# Patient Record
Sex: FEMALE | Race: BLACK OR AFRICAN AMERICAN | NOT HISPANIC OR LATINO | Employment: OTHER | URBAN - METROPOLITAN AREA
[De-identification: names, ages, dates, MRNs, and addresses within clinical notes are randomized per-mention and may not be internally consistent; named-entity substitution may affect disease eponyms.]

---

## 2023-02-22 ENCOUNTER — LAB REQUISITION (OUTPATIENT)
Dept: LAB | Facility: HOSPITAL | Age: 68
End: 2023-02-22
Payer: MEDICARE

## 2023-02-22 DIAGNOSIS — I63.81 OTHER CEREBRAL INFARCTION DUE TO OCCLUSION OR STENOSIS OF SMALL ARTERY: ICD-10-CM

## 2023-02-22 LAB
ALBUMIN SERPL-MCNC: 3.3 G/DL (ref 3.4–4.8)
ALBUMIN/GLOB SERPL: 0.9 RATIO (ref 1.1–2)
ALP SERPL-CCNC: 61 UNIT/L (ref 40–150)
ALT SERPL-CCNC: 15 UNIT/L (ref 0–55)
AST SERPL-CCNC: 17 UNIT/L (ref 5–34)
BASOPHILS # BLD AUTO: 0.04 X10(3)/MCL (ref 0–0.2)
BASOPHILS NFR BLD AUTO: 0.5 %
BILIRUBIN DIRECT+TOT PNL SERPL-MCNC: 0.6 MG/DL
BUN SERPL-MCNC: 17.8 MG/DL (ref 9.8–20.1)
CALCIUM SERPL-MCNC: 10.1 MG/DL (ref 8.4–10.2)
CHLORIDE SERPL-SCNC: 103 MMOL/L (ref 98–107)
CO2 SERPL-SCNC: 26 MMOL/L (ref 23–31)
CREAT SERPL-MCNC: 0.94 MG/DL (ref 0.55–1.02)
EOSINOPHIL # BLD AUTO: 0.12 X10(3)/MCL (ref 0–0.9)
EOSINOPHIL NFR BLD AUTO: 1.4 %
ERYTHROCYTE [DISTWIDTH] IN BLOOD BY AUTOMATED COUNT: 11.9 % (ref 11.5–17)
GFR SERPLBLD CREATININE-BSD FMLA CKD-EPI: >60 MLS/MIN/1.73/M2
GLOBULIN SER-MCNC: 3.7 GM/DL (ref 2.4–3.5)
GLUCOSE SERPL-MCNC: 137 MG/DL (ref 82–115)
HCT VFR BLD AUTO: 35.8 % (ref 37–47)
HGB BLD-MCNC: 11.8 G/DL (ref 12–16)
IMM GRANULOCYTES # BLD AUTO: 0.02 X10(3)/MCL (ref 0–0.04)
IMM GRANULOCYTES NFR BLD AUTO: 0.2 %
LYMPHOCYTES # BLD AUTO: 2.09 X10(3)/MCL (ref 0.6–4.6)
LYMPHOCYTES NFR BLD AUTO: 24.1 %
MCH RBC QN AUTO: 29.4 PG
MCHC RBC AUTO-ENTMCNC: 33 G/DL (ref 33–36)
MCV RBC AUTO: 89.1 FL (ref 80–94)
MONOCYTES # BLD AUTO: 0.53 X10(3)/MCL (ref 0.1–1.3)
MONOCYTES NFR BLD AUTO: 6.1 %
NEUTROPHILS # BLD AUTO: 5.88 X10(3)/MCL (ref 2.1–9.2)
NEUTROPHILS NFR BLD AUTO: 67.7 %
NRBC BLD AUTO-RTO: 0 %
PLATELET # BLD AUTO: 414 X10(3)/MCL (ref 130–400)
PMV BLD AUTO: 11.5 FL (ref 7.4–10.4)
POTASSIUM SERPL-SCNC: 4.7 MMOL/L (ref 3.5–5.1)
PREALB SERPL-MCNC: 20 MG/DL (ref 14–37)
PROT SERPL-MCNC: 7 GM/DL (ref 5.8–7.6)
RBC # BLD AUTO: 4.02 X10(6)/MCL (ref 4.2–5.4)
SODIUM SERPL-SCNC: 139 MMOL/L (ref 136–145)
WBC # SPEC AUTO: 8.7 X10(3)/MCL (ref 4.5–11.5)

## 2023-02-22 PROCEDURE — 85025 COMPLETE CBC W/AUTO DIFF WBC: CPT | Performed by: INTERNAL MEDICINE

## 2023-02-22 PROCEDURE — 80053 COMPREHEN METABOLIC PANEL: CPT | Performed by: INTERNAL MEDICINE

## 2023-02-22 PROCEDURE — 84134 ASSAY OF PREALBUMIN: CPT | Performed by: INTERNAL MEDICINE

## 2023-03-02 ENCOUNTER — HOSPITAL ENCOUNTER (EMERGENCY)
Facility: HOSPITAL | Age: 68
Discharge: LEFT BEFORE DISCHARGE | End: 2023-03-03
Payer: MEDICARE

## 2023-03-02 VITALS
WEIGHT: 145 LBS | TEMPERATURE: 98 F | OXYGEN SATURATION: 98 % | DIASTOLIC BLOOD PRESSURE: 74 MMHG | SYSTOLIC BLOOD PRESSURE: 116 MMHG | HEART RATE: 106 BPM | RESPIRATION RATE: 20 BRPM

## 2023-03-02 DIAGNOSIS — R13.10 DYSPHAGIA: ICD-10-CM

## 2023-03-02 LAB
ALBUMIN SERPL-MCNC: 3.6 G/DL (ref 3.4–4.8)
ALBUMIN/GLOB SERPL: 0.8 RATIO (ref 1.1–2)
ALP SERPL-CCNC: 66 UNIT/L (ref 40–150)
ALT SERPL-CCNC: 22 UNIT/L (ref 0–55)
APTT PPP: 28.6 SECONDS (ref 23.2–33.7)
AST SERPL-CCNC: 20 UNIT/L (ref 5–34)
BASOPHILS # BLD AUTO: 0.05 X10(3)/MCL (ref 0–0.2)
BASOPHILS NFR BLD AUTO: 0.5 %
BILIRUBIN DIRECT+TOT PNL SERPL-MCNC: 0.6 MG/DL
BUN SERPL-MCNC: 21.5 MG/DL (ref 9.8–20.1)
CALCIUM SERPL-MCNC: 10.3 MG/DL (ref 8.4–10.2)
CHLORIDE SERPL-SCNC: 98 MMOL/L (ref 98–107)
CO2 SERPL-SCNC: 26 MMOL/L (ref 23–31)
CREAT SERPL-MCNC: 1.1 MG/DL (ref 0.55–1.02)
EOSINOPHIL # BLD AUTO: 0.11 X10(3)/MCL (ref 0–0.9)
EOSINOPHIL NFR BLD AUTO: 1.1 %
ERYTHROCYTE [DISTWIDTH] IN BLOOD BY AUTOMATED COUNT: 12.2 % (ref 11.5–17)
GFR SERPLBLD CREATININE-BSD FMLA CKD-EPI: 56 MLS/MIN/1.73/M2
GLOBULIN SER-MCNC: 4.5 GM/DL (ref 2.4–3.5)
GLUCOSE SERPL-MCNC: 160 MG/DL (ref 82–115)
HCT VFR BLD AUTO: 37.3 % (ref 37–47)
HGB BLD-MCNC: 12.2 G/DL (ref 12–16)
IMM GRANULOCYTES # BLD AUTO: 0.03 X10(3)/MCL (ref 0–0.04)
IMM GRANULOCYTES NFR BLD AUTO: 0.3 %
INR BLD: 1.11 (ref 0–1.3)
LYMPHOCYTES # BLD AUTO: 2.05 X10(3)/MCL (ref 0.6–4.6)
LYMPHOCYTES NFR BLD AUTO: 20 %
MCH RBC QN AUTO: 29 PG
MCHC RBC AUTO-ENTMCNC: 32.7 G/DL (ref 33–36)
MCV RBC AUTO: 88.6 FL (ref 80–94)
MONOCYTES # BLD AUTO: 0.68 X10(3)/MCL (ref 0.1–1.3)
MONOCYTES NFR BLD AUTO: 6.6 %
NEUTROPHILS # BLD AUTO: 7.32 X10(3)/MCL (ref 2.1–9.2)
NEUTROPHILS NFR BLD AUTO: 71.5 %
NRBC BLD AUTO-RTO: 0 %
PLATELET # BLD AUTO: 426 X10(3)/MCL (ref 130–400)
PMV BLD AUTO: 11.3 FL (ref 7.4–10.4)
POTASSIUM SERPL-SCNC: 4.4 MMOL/L (ref 3.5–5.1)
PROT SERPL-MCNC: 8.1 GM/DL (ref 5.8–7.6)
PROTHROMBIN TIME: 14.2 SECONDS (ref 12.5–14.5)
RBC # BLD AUTO: 4.21 X10(6)/MCL (ref 4.2–5.4)
SODIUM SERPL-SCNC: 137 MMOL/L (ref 136–145)
WBC # SPEC AUTO: 10.2 X10(3)/MCL (ref 4.5–11.5)

## 2023-03-02 PROCEDURE — 80053 COMPREHEN METABOLIC PANEL: CPT | Performed by: NURSE PRACTITIONER

## 2023-03-02 PROCEDURE — 85025 COMPLETE CBC W/AUTO DIFF WBC: CPT | Performed by: NURSE PRACTITIONER

## 2023-03-02 PROCEDURE — 99284 EMERGENCY DEPT VISIT MOD MDM: CPT | Mod: 25

## 2023-03-02 PROCEDURE — 85730 THROMBOPLASTIN TIME PARTIAL: CPT | Performed by: NURSE PRACTITIONER

## 2023-03-02 PROCEDURE — 85610 PROTHROMBIN TIME: CPT | Performed by: NURSE PRACTITIONER

## 2023-03-02 NOTE — FIRST PROVIDER EVALUATION
Medical screening examination initiated.  I have conducted a focused provider triage encounter, findings are as follows:    Brief history of present illness:  68 y/o female who presents from [facility] for failing swallow study and now needs PEG tube placement.     There were no vitals filed for this visit.    Pertinent physical exam:  alert, in wheelchair, states her head hurts    Brief workup plan:  exam, labs    Preliminary workup initiated; this workup will be continued and followed by the physician or advanced practice provider that is assigned to the patient when roomed.

## 2023-03-16 ENCOUNTER — TELEPHONE (OUTPATIENT)
Dept: GASTROENTEROLOGY | Facility: CLINIC | Age: 68
End: 2023-03-16
Payer: MEDICARE

## 2023-03-16 NOTE — TELEPHONE ENCOUNTER
----- Message from Rebekah Bass sent at 3/15/2023  1:34 PM CDT -----  Contact: @ [phone] or [phone]   is calling to make a appointment for a hospital discharge for  GERD (Acid Reflux); Dysphagia (Difficulty Swallowing) no available dates please call and adv @ [phone] or [phone]

## 2023-03-22 ENCOUNTER — TELEPHONE (OUTPATIENT)
Dept: GASTROENTEROLOGY | Facility: CLINIC | Age: 68
End: 2023-03-22
Payer: MEDICARE

## 2023-03-22 NOTE — TELEPHONE ENCOUNTER
----- Message from Ciarra Potter sent at 3/22/2023  1:54 PM CDT -----  Regarding: Appt Access  Contact: [phone]   is calling to make a appointment for a hospital discharge for  GERD (Acid Reflux); Dysphagia (Difficulty Swallowing) no available dates please call and adv @ [phone] or [phone]

## 2023-03-24 ENCOUNTER — TELEPHONE (OUTPATIENT)
Dept: ENDOSCOPY | Facility: HOSPITAL | Age: 68
End: 2023-03-24
Payer: MEDICARE

## 2023-03-24 NOTE — TELEPHONE ENCOUNTER
----- Message from Domenica Bolden RN sent at 3/24/2023 11:17 AM CDT -----  Regarding: FW: Appt  Contact: [phone]  Not sure who this needs to go to  ----- Message -----  From: Raul Sullivan  Sent: 3/24/2023  11:14 AM CDT  To: Mati Hernandez Staff  Subject: Appt                                             [facility] Care Coordinator (Fouzia Whitaker) calling on behalf of patient requesting a appt for possible Cancer that the patient may have. I was unable to schedule .... please call and advise @ [phone] or email [email or web address]

## 2023-03-27 ENCOUNTER — TELEPHONE (OUTPATIENT)
Dept: ENDOSCOPY | Facility: HOSPITAL | Age: 68
End: 2023-03-27
Payer: MEDICARE

## 2023-03-27 NOTE — TELEPHONE ENCOUNTER
----- Message from Moira Feldman sent at 3/27/2023  8:59 AM CDT -----  Regarding: appt  Contact: Fouzia [phone] or Cori [phone]  Call[facility]Fouzia from [facility] returning a call regarding an appt for Pt. Please call to discuss further.

## 2023-03-27 NOTE — TELEPHONE ENCOUNTER
----- Message from Domenica Bolden RN sent at 3/27/2023  1:13 PM CDT -----  Regarding: FW: pt called    ----- Message -----  From: Jerica Torres  Sent: 3/27/2023   1:10 PM CDT  To: Mati Hernandez Staff  Subject: pt called                                        Type:  Patient Returning Call        Who Called:         Who Left Message for Patient:    Anisa called    Does the patient know what this is regarding?    The pt needs to be seen as soon as possible if you leave a message please give them a appt and they will contact you if it doesn't work.     Best Call Back Number: [phone] (home)           Additional Information:

## 2023-03-29 ENCOUNTER — TELEPHONE (OUTPATIENT)
Dept: ENDOSCOPY | Facility: HOSPITAL | Age: 68
End: 2023-03-29
Payer: MEDICARE

## 2023-03-29 NOTE — TELEPHONE ENCOUNTER
----- Message from Ngozi Shahid sent at 3/29/2023 12:43 PM CDT -----  Regarding: call back  Contact: tosha  [phone]  Case management call in requesting call back please call to discus  further

## 2023-03-30 ENCOUNTER — TELEPHONE (OUTPATIENT)
Dept: ENDOSCOPY | Facility: HOSPITAL | Age: 68
End: 2023-03-30
Payer: MEDICARE

## 2023-03-30 NOTE — TELEPHONE ENCOUNTER
----- Message from Ciarra Potter sent at 3/30/2023  8:56 AM CDT -----  Regarding: Pt Advice  Contact: [phone]  Fouzia with [employer] is calling in reference to the above patient.  Please call.

## 2023-03-30 NOTE — TELEPHONE ENCOUNTER
----- Message from Vitor Salas MA sent at 3/30/2023  1:40 PM CDT -----  Regarding: FW: returning missed call  Contact: Cori [phone]    ----- Message -----  From: Miquel Morillo  Sent: 3/30/2023  12:39 PM CDT  To: [facility] Clinical Staff  Subject: returning missed call                            Cori Ballesteros from [facility], returning call from Filippo. Please call to advise further thank you.

## 2023-04-03 ENCOUNTER — LAB REQUISITION (OUTPATIENT)
Dept: LAB | Facility: HOSPITAL | Age: 68
End: 2023-04-03
Payer: MEDICARE

## 2023-04-03 DIAGNOSIS — K92.1 MELENA: ICD-10-CM

## 2023-04-03 LAB
ALBUMIN SERPL-MCNC: 3.4 G/DL (ref 3.4–4.8)
ALBUMIN/GLOB SERPL: 1 RATIO (ref 1.1–2)
ALP SERPL-CCNC: 51 UNIT/L (ref 40–150)
ALT SERPL-CCNC: 18 UNIT/L (ref 0–55)
AST SERPL-CCNC: 17 UNIT/L (ref 5–34)
BASOPHILS # BLD AUTO: 0.04 X10(3)/MCL (ref 0–0.2)
BASOPHILS NFR BLD AUTO: 0.5 %
BILIRUBIN DIRECT+TOT PNL SERPL-MCNC: 0.5 MG/DL
BUN SERPL-MCNC: 9.9 MG/DL (ref 9.8–20.1)
CALCIUM SERPL-MCNC: 10.1 MG/DL (ref 8.4–10.2)
CHLORIDE SERPL-SCNC: 105 MMOL/L (ref 98–107)
CO2 SERPL-SCNC: 26 MMOL/L (ref 23–31)
CREAT SERPL-MCNC: 1.03 MG/DL (ref 0.55–1.02)
EOSINOPHIL # BLD AUTO: 0.14 X10(3)/MCL (ref 0–0.9)
EOSINOPHIL NFR BLD AUTO: 1.6 %
ERYTHROCYTE [DISTWIDTH] IN BLOOD BY AUTOMATED COUNT: 13.2 % (ref 11.5–17)
GFR SERPLBLD CREATININE-BSD FMLA CKD-EPI: 60 MLS/MIN/1.73/M2
GLOBULIN SER-MCNC: 3.3 GM/DL (ref 2.4–3.5)
GLUCOSE SERPL-MCNC: 204 MG/DL (ref 82–115)
HCT VFR BLD AUTO: 37.1 % (ref 37–47)
HGB BLD-MCNC: 11.7 G/DL (ref 12–16)
IMM GRANULOCYTES # BLD AUTO: 0.02 X10(3)/MCL (ref 0–0.04)
IMM GRANULOCYTES NFR BLD AUTO: 0.2 %
LYMPHOCYTES # BLD AUTO: 2.23 X10(3)/MCL (ref 0.6–4.6)
LYMPHOCYTES NFR BLD AUTO: 25.7 %
MCH RBC QN AUTO: 28.8 PG (ref 27–31)
MCHC RBC AUTO-ENTMCNC: 31.5 G/DL (ref 33–36)
MCV RBC AUTO: 91.4 FL (ref 80–94)
MONOCYTES # BLD AUTO: 0.52 X10(3)/MCL (ref 0.1–1.3)
MONOCYTES NFR BLD AUTO: 6 %
NEUTROPHILS # BLD AUTO: 5.73 X10(3)/MCL (ref 2.1–9.2)
NEUTROPHILS NFR BLD AUTO: 66 %
NRBC BLD AUTO-RTO: 0 %
PLATELET # BLD AUTO: 220 X10(3)/MCL (ref 130–400)
PMV BLD AUTO: 12.3 FL (ref 7.4–10.4)
POTASSIUM SERPL-SCNC: 4.1 MMOL/L (ref 3.5–5.1)
PROT SERPL-MCNC: 6.7 GM/DL (ref 5.8–7.6)
RBC # BLD AUTO: 4.06 X10(6)/MCL (ref 4.2–5.4)
SODIUM SERPL-SCNC: 140 MMOL/L (ref 136–145)
WBC # SPEC AUTO: 8.7 X10(3)/MCL (ref 4.5–11.5)

## 2023-04-03 PROCEDURE — 85025 COMPLETE CBC W/AUTO DIFF WBC: CPT | Performed by: NURSE PRACTITIONER

## 2023-04-03 PROCEDURE — 80053 COMPREHEN METABOLIC PANEL: CPT | Performed by: NURSE PRACTITIONER

## 2023-08-01 ENCOUNTER — LAB REQUISITION (OUTPATIENT)
Dept: LAB | Facility: HOSPITAL | Age: 68
End: 2023-08-01
Payer: MEDICARE

## 2023-08-01 DIAGNOSIS — I10 ESSENTIAL (PRIMARY) HYPERTENSION: ICD-10-CM

## 2023-08-01 DIAGNOSIS — E78.5 HYPERLIPIDEMIA, UNSPECIFIED: ICD-10-CM

## 2023-08-01 LAB
ALBUMIN SERPL-MCNC: 3.4 G/DL (ref 3.4–4.8)
ALBUMIN/GLOB SERPL: 1.2 RATIO (ref 1.1–2)
ALP SERPL-CCNC: 56 UNIT/L (ref 40–150)
ALT SERPL-CCNC: 17 UNIT/L (ref 0–55)
AST SERPL-CCNC: 15 UNIT/L (ref 5–34)
BASOPHILS # BLD AUTO: 0.04 X10(3)/MCL
BASOPHILS NFR BLD AUTO: 0.4 %
BILIRUBIN DIRECT+TOT PNL SERPL-MCNC: 0.2 MG/DL
BUN SERPL-MCNC: 12.5 MG/DL (ref 9.8–20.1)
CALCIUM SERPL-MCNC: 9.8 MG/DL (ref 8.4–10.2)
CHLORIDE SERPL-SCNC: 103 MMOL/L (ref 98–107)
CHOLEST SERPL-MCNC: 138 MG/DL
CHOLEST/HDLC SERPL: 3 {RATIO} (ref 0–5)
CO2 SERPL-SCNC: 31 MMOL/L (ref 23–31)
CREAT SERPL-MCNC: 0.99 MG/DL (ref 0.55–1.02)
EOSINOPHIL # BLD AUTO: 0.19 X10(3)/MCL (ref 0–0.9)
EOSINOPHIL NFR BLD AUTO: 2.1 %
ERYTHROCYTE [DISTWIDTH] IN BLOOD BY AUTOMATED COUNT: 13.6 % (ref 11.5–17)
GFR SERPLBLD CREATININE-BSD FMLA CKD-EPI: >60 MLS/MIN/1.73/M2
GLOBULIN SER-MCNC: 2.8 GM/DL (ref 2.4–3.5)
GLUCOSE SERPL-MCNC: 135 MG/DL (ref 82–115)
HCT VFR BLD AUTO: 35.6 % (ref 37–47)
HDLC SERPL-MCNC: 42 MG/DL (ref 35–60)
HGB BLD-MCNC: 11.5 G/DL (ref 12–16)
IMM GRANULOCYTES # BLD AUTO: 0.01 X10(3)/MCL (ref 0–0.04)
IMM GRANULOCYTES NFR BLD AUTO: 0.1 %
LDLC SERPL CALC-MCNC: 76 MG/DL (ref 50–140)
LYMPHOCYTES # BLD AUTO: 2.67 X10(3)/MCL (ref 0.6–4.6)
LYMPHOCYTES NFR BLD AUTO: 29.4 %
MCH RBC QN AUTO: 29.1 PG (ref 27–31)
MCHC RBC AUTO-ENTMCNC: 32.3 G/DL (ref 33–36)
MCV RBC AUTO: 90.1 FL (ref 80–94)
MONOCYTES # BLD AUTO: 0.6 X10(3)/MCL (ref 0.1–1.3)
MONOCYTES NFR BLD AUTO: 6.6 %
NEUTROPHILS # BLD AUTO: 5.57 X10(3)/MCL (ref 2.1–9.2)
NEUTROPHILS NFR BLD AUTO: 61.4 %
NRBC BLD AUTO-RTO: 0 %
PLATELET # BLD AUTO: 161 X10(3)/MCL (ref 130–400)
PMV BLD AUTO: 11.8 FL (ref 7.4–10.4)
POTASSIUM SERPL-SCNC: 4.8 MMOL/L (ref 3.5–5.1)
PREALB SERPL-MCNC: 22 MG/DL (ref 14–37)
PROT SERPL-MCNC: 6.2 GM/DL (ref 5.8–7.6)
RBC # BLD AUTO: 3.95 X10(6)/MCL (ref 4.2–5.4)
SODIUM SERPL-SCNC: 142 MMOL/L (ref 136–145)
TRIGL SERPL-MCNC: 100 MG/DL (ref 37–140)
VLDLC SERPL CALC-MCNC: 20 MG/DL
WBC # SPEC AUTO: 9.08 X10(3)/MCL (ref 4.5–11.5)

## 2023-08-01 PROCEDURE — 80053 COMPREHEN METABOLIC PANEL: CPT | Performed by: INTERNAL MEDICINE

## 2023-08-01 PROCEDURE — 80061 LIPID PANEL: CPT | Performed by: INTERNAL MEDICINE

## 2023-08-01 PROCEDURE — 85025 COMPLETE CBC W/AUTO DIFF WBC: CPT | Performed by: INTERNAL MEDICINE

## 2023-08-01 PROCEDURE — 84134 ASSAY OF PREALBUMIN: CPT | Performed by: INTERNAL MEDICINE

## 2023-11-03 ENCOUNTER — HOSPITAL ENCOUNTER (OUTPATIENT)
Dept: TELEMEDICINE | Facility: HOSPITAL | Age: 68
Discharge: HOME | End: 2023-11-03
Payer: MEDICARE

## 2023-11-03 DIAGNOSIS — I63.232 STROKE DUE TO STENOSIS OF LEFT CAROTID ARTERY: ICD-10-CM

## 2023-11-03 PROCEDURE — G0426 INPT/ED TELECONSULT50: HCPCS | Mod: 95,,, | Performed by: STUDENT IN AN ORGANIZED HEALTH CARE EDUCATION/TRAINING PROGRAM

## 2023-11-03 PROCEDURE — G0426 PR INPT TELEHEALTH CONSULT 50M: ICD-10-PCS | Mod: 95,,, | Performed by: STUDENT IN AN ORGANIZED HEALTH CARE EDUCATION/TRAINING PROGRAM

## 2023-11-03 PROCEDURE — G0426 PR INPT TELEHEALTH CONSULT 50M: ICD-10-PCS | Mod: 95,G0,, | Performed by: STUDENT IN AN ORGANIZED HEALTH CARE EDUCATION/TRAINING PROGRAM

## 2023-11-03 PROCEDURE — G0426 INPT/ED TELECONSULT50: HCPCS | Mod: 95,G0,, | Performed by: STUDENT IN AN ORGANIZED HEALTH CARE EDUCATION/TRAINING PROGRAM

## 2023-11-03 NOTE — CONSULTS
[facility]  Vascular Neurology  Comprehensive Stroke Center  TeleVascular Neurology Acute Consultation Note      Consults    Consulting Provider: RENÉ العراقي  Current Providers  No providers found    Patient Location: [facility] TELEMEDICINE ED [facility] Emergency Department  Spoke hospital nurse at bedside with patient assisting consultant.     Patient information was obtained from ER records.         Assessment/Plan:       Diagnoses:   Neuro  * Stroke due to stenosis of left carotid artery  67 yo F w/ PMHx of IS, HLD, Schizophremia, HTN who presents w/ RSW.   Baseline aphasia.   Comes from NH -last checked at 12 AM, at 8 AM they noted RSW at that time.   LKW  12 AM  No family member available at bedside to assist w/ history.   /71  HR 69      NIHSS 18. Unclear what symptoms are chronic vs acute as no family at bedside but reportedly aphasia is baseline and RSW is new.   CTH w/L insular hypodensity and multiple areas of encephalomalacia in the L MCA distribution, within borderzone territories.,   Not a TNK candidate as OOW and w/ new area of hypodensity consistent w/ acute infarct.     Pending CTA H/N to evaluate vasculature and determine if she is a thrombectomy candidate.     Recommendations:  CTA Head & Neck ASAP to evaluate cervico-cephalic vasculature for high risk culprit vessel disease or large/medium vessel occlusion  If no LVO, recommend admission for completion of stroke w/u  MRI brain to evaluate for presence and/or extent of ischemic injury  Allow for permissive HTNsion up to 220/110 until AIS is r/o w/ imaging   Lipid profile, A1c, TSH  ECHO w/o bubble study  PT/OT/SLP eval and Rx  IVF to allow for maximum cerebral perfusion  HOB flat   Load aspirin 325 mg & clopidogrel 300 mg x 1 now, followed by daily aspirin 81 mg /  clopidogrel 75 mg x 30 days followed by monotherapy thereafter  If unable to swallow safely, recommend  rectal  mg  High intensity statin for LDL goal <70 long term     Recommendations discussed w/ Treating ER physician.           STROKE DOCUMENTATION     Acute Stroke Times:   Acute Stroke Times   Last Known Normal Date: 11/03/23  Last Known Normal Time: 1200  Symptom Onset Date: 11/03/23  Unknown Symptom Onset Time: Unknown Time  Stroke Team Called Date: 11/03/23  Stroke Team Called Time: 1107  Stroke Team Arrival Date: 11/03/23  Stroke Team Arrival Time: 1120  CT Interpretation Time: 1125  Thrombolytic Therapy Recommended: No  CTA Interpretation Time:  (Pending)    NIH Scale:  1a. Level of Consciousness: 1-->Not alert, but arousable by minor stimulation to obey, answer, or respond  1b. LOC Questions: 2-->Answers neither question correctly  1c. LOC Commands: 2-->Performs neither task correctly  2. Best Gaze: 0-->Normal  3. Visual: 0-->No visual loss  4. Facial Palsy: 2-->Partial paralysis (total or near-total paralysis of lower face)  5a. Motor Arm, Left: 0-->No drift, limb holds 90 (or 45) degrees for full 10 secs  5b. Motor Arm, Right: 4-->No movement  6a. Motor Leg, Left: 0-->No drift, leg holds 30 degree position for full 5 secs  6b. Motor Leg, Right: 4-->No movement  7. Limb Ataxia: 0-->Absent  8. Sensory: 0-->Normal, no sensory loss  9. Best Language: 3-->Mute, global aphasia, no usable speech or auditory comprehension  10. Dysarthria: 0-->Normal  11. Extinction and Inattention (formerly Neglect): 0-->No abnormality  Total (NIH Stroke Scale): 18     Modified Jason Score: 2  [place] Coma Scale:    ABCD2 Score:    [id number] Score:   HAS -BLED Score:   ICH Score:   Hunt & Kilgore Classification:       There were no vitals taken for this visit.  Eligible for thrombolytic therapy?: No  Thrombolytic therapy recomended: Thrombolytic therapy not recommended due to Outside of treatment window , Unknown/unclear onset , and Hypodensity on CT   Possible Interventional Revascularization Candidate? Awaiting CTA  results from Spoke for determination     Disposition Recommendation: pending further studies  admit to inpatient    Subjective:     History of Present Illness:  67 yo F w/ PMHx of IS, HLD, Schizophremia, HTN who presents w/ RSW.   Baseline aphasia.   Comes from NH -last checked at 12 AM, at 8 AM they noted RSW at that time.   LKW  12 AM  No family member available at bedside to assist w/ history.   /71  HR 69            Recommended the emergency room physician to have a brief discussion with the patient and/or family if available regarding the  risks and benefits of treatment, and to briefly document the occurrence of that discussion in his clinical encounter note.     The attending portion of this evaluation, treatment, and documentation was performed per Jailene Price MD via audiovisual.    Billing code:  (moderate to severe stroke, large areas of edema, some mimics)      This patient has a critical neurological condition/illness, with high morbidity and mortality.  There is a high probability for acute neurological change leading to clinical and possibly life-threatening deterioration requiring highest level of physician preparedness for urgent intervention.  Care was coordinated with other physicians involved in the patient's care.  Radiologic studies and laboratory data were reviewed and interpreted, and plan of care was re-assessed based on the results.  Diagnosis, treatment options and prognosis may have been discussed with the patient and/or family members or caregiver.  Further advanced medical management and further evaluation is warranted for his care.    In your opinion, this was a: Tier 1 Van Positive    Consult End Time: 11:40 AM     Jailene Price MD  [facility]  Vascular Neurology   [facility]

## 2023-11-03 NOTE — HPI
68F w/ PMHx of prior stroke, HLD, Schizophremia, HTN who presents w/ RSW.       Baseline aphasia.   Comes from NH -last checked at 12 AM, at 8 AM they noted RSW at that time.   LKW  12 AM  No family member available at bedside to assist w/ history.   /71  HR 69

## 2023-11-03 NOTE — ASSESSMENT & PLAN NOTE
69 yo F w/ PMHx of IS, HLD, Schizophremia, HTN who presents w/ RSW.   Baseline aphasia.   Comes from NH -last checked at 12 AM, at 8 AM they noted RSW at that time.   LKW  12 AM  No family member available at bedside to assist w/ history.   /71  HR 69      NIHSS 18. Unclear what symptoms are chronic vs acute as no family at bedside but reportedly aphasia is baseline and RSW is new.   CTH w/L insular hypodensity and multiple areas of encephalomalacia in the L MCA distribution, within borderzone territories.,   Not a TNK candidate as OOW and w/ new area of hypodensity consistent w/ acute infarct.     Pending CTA H/N to evaluate vasculature and determine if she is a thrombectomy candidate.     Recommendations:  · CTA Head & Neck ASAP to evaluate cervico-cephalic vasculature for high risk culprit vessel disease or large/medium vessel occlusion  · If no LVO, recommend admission for completion of stroke w/u  · MRI brain to evaluate for presence and/or extent of ischemic injury  · Allow for permissive HTNsion up to 220/110 until AIS is r/o w/ imaging   · Lipid profile, A1c, TSH  · ECHO w/o bubble study  · PT/OT/SLP eval and Rx  · IVF to allow for maximum cerebral perfusion  · HOB flat   · Load aspirin 325 mg & clopidogrel 300 mg x 1 now, followed by daily aspirin 81 mg /  clopidogrel 75 mg x 30 days followed by monotherapy thereafter  · If unable to swallow safely, recommend rectal  mg  · High intensity statin for LDL goal <70 long term     Recommendations discussed w/ Treating ER physician.

## 2023-11-04 NOTE — CONSULTS
[facility]  Vascular Neurology  TeleVascular Neurology Consultation Note      Consults    Assessment & Plan:     Diagnoses:   Neuro  * Stroke due to occlusion of left middle cerebral artery  68F presenting with right hemiplegia.     LKN 12AM 11/3. Symptoms discovered at 0800. Telestroke performed around 11AM. NCCTH obtained 11/3 reviewed with EIC in the left insula, frontal, temporal lobes. Follow-up CTA head/neck shows left M1 occlusion vs high-grade stenosis and diminished filling in the distal vessels.     MRI brain was also performed (images are not available for review), which shows multiple areas of acute/subacute infarction in the left MCA territory (per primary team report).     She presented outside of the therapeutic window for IV thrombolysis. She is not a candidate for IR intervention at this time given last known well >24h and established areas of infarction. Etiology large artery atherosclerosis.      Antithrombotics for secondary stroke prevention: Continue aspirin 325mg and clopidogrel 75mg x 90 days. Then aspirin 81mg indefinitely.     Statins for secondary stroke prevention and hyperlipidemia, if present: Recommend initiation or continuation of a high-intensity statin for goal LDL < 70mg/dL.    Aggressive risk factor modification: HTN, HLD     Rehab efforts: The patient has been evaluated by a stroke team provider and the therapy needs have been fully considered based off the presenting complaints and exam findings. The following therapy evaluations are needed: PT evaluate and treat, OT evaluate and treat, SLP evaluate and treat, PM&R evaluate for appropriate placement    Diagnostics recommended:   - TTE without bubble study, monitor on telemetry while admitted  - Labs: hemoglobin A1c (goal <7%), lipid panel (LDL goal <70mg/dL), TSH  - Treat hyperglycemia to achieve a blood glucose level in a range of 140-180 mg/dL and to closely monitor to prevent hypoglycemia  (Class IIa, Level C-LD).    VTE prophylaxis: Enoxaparin 40 mg SQ every 24 hours  Mechanical prophylaxis: Place SCDs    BP parameters: Infarct: No intervention, SBP <220    - Patient may be at risk for worsening deficits with positional changes or drops in blood pressure  (pressure-dependent state), or if there is extension of stroke or development of cerebral edema;  as such, recommend close neurological monitoring.  - Recommend permissive hypertension for the initial 24 to 48 hours of acute ischemic stroke unless the blood pressure is >220 mm Hg systolic or >120 mm Hg diastolic, or there is a concomitant specific medical condition that would benefit from blood pressure lowering (e.g., MI, aortic dissection).  - In the setting of possible hypovolemia, initiate IV fluids to maintain adequate hydration and euvolemia (although be cautious in patients at risk of fluid overload, such as those with renal or heart failure).  - The optimal time after the onset of acute ischemic stroke to restart or start long-term antihypertensive therapy has not been established and may depend on various patient and stroke characteristics (e.g., pressure dependent state), but in most patients it is reasonable to initiate long-term antihypertensive therapy after the initial 48 to 72 hours from stroke onset.    Please contact us with any further questions or concerns or if patient has any acute neurological changes (new symptoms, worsening deficits).            NIH Stroke Scale:  Interval: baseline  1a. Level of Consciousness: 0-->Alert, keenly responsive  1b. LOC Questions: 2-->Answers neither question correctly  1c. LOC Commands: 2-->Performs neither task correctly  2. Best Gaze: 0-->Normal  3. Visual: 0-->No visual loss  4. Facial Palsy: 2-->Partial paralysis (total or near-total paralysis of lower face)  5a. Motor Arm, Left: 0-->No drift, limb holds 90 (or 45) degrees for full 10 secs  5b. Motor Arm, Right: 4-->No movement  6a. Motor  "Leg, Left: 0-->No drift, leg holds 30 degree position for full 5 secs  6b. Motor Leg, Right: 4-->No movement  7. Limb Ataxia: 0-->Absent  8. Sensory: 1-->Mild-to-moderate sensory loss, patient feels pinprick is less sharp or is dull on the affected side, or there is a loss of superficial pain with pinprick, but patient is aware of being touched  9. Best Language: 3-->Mute, global aphasia, no usable speech or auditory comprehension  10. Dysarthria: 2-->Severe dysarthria, patients speech is so slurred as to be unintelligible in the absence of or out of proportion to any dysphasia, or is mute/anarthric  11. Extinction and Inattention (formerly Neglect): 0-->No abnormality  Total (NIH Stroke Scale): 20     Modified Barnes Score: 2  [place] Coma Scale:    ABCD2 Score:    [id number] Score:   HAS -BLED Score:   ICH Score:   Hunt & Kilgore Classification:     Thrombolysis Candidate? No, Out of window - Symptom onset > 4.5 hours    Delays to Thrombolysis? No    Interventional Revascularization Candidate?  Is the patient eligible for mechanical endovascular reperfusion (DELIO)?  No; Large core infarct on imaging , last known well >24 hours    Delays to Thrombectomy? No    Hemorrhagic change of an Ischemic Stroke: Does this patient have an ischemic stroke with hemorrhagic changes? No     Objective:     Brain Imaging  MRI brain without contrast (as read to me by the primary team, images are not available for my review): Multiple subacute cerebral infarcts on the left MCA distribution. No hemorrhage.     Vessel Imaging    CTA head/neck (11/03/2023): Left M1 occlusion vs high-grade stenosis with diminished filling in the distal vessels.     Cardiac Imaging    Other Diagnostic    Lab Results   Component Value Date    CHOL 138 08/01/2023    HDL 42 08/01/2023    TRIG 100 08/01/2023     No results found for: "LABA1C", "HGBA1C"    Lab Results   Component Value Date     08/01/2023    K 4.8 08/01/2023    CO2 31 08/01/2023    BUN 12.5 " "08/01/2023    CREATININE 0.99 08/01/2023    CALCIUM 9.8 08/01/2023    ALBUMIN 3.4 08/01/2023    BILITOT 0.2 08/01/2023    ALKPHOS 56 08/01/2023    AST 15 08/01/2023    ALT 17 08/01/2023    EGFRNONAA 52 04/23/2018       No results for input(s): "PT", "INR", "APTT" in the last 24 hours.      History:     68F w/ PMHx of prior stroke with residual speech deficits and mild left hemiparesis, HLD, schizophrenia, HTN who presents w/ right hemiplegia.     LKN 12AM 11/3. Symptoms discovered at 0800. Telestroke performed 11/3 around 11AM. NCCTH reviewed with EIC in the left insula, frontal, temporal lobes. CTA head/neck was performed, which shows left M1 occlusion and diminished filling in the distal vessels.     MRI brain was also performed (images are not available for review), which shows multiple areas of acute/subacute infarction in the left MCA territory (per primary team report).     She was started on DAPT and admitted for further work-up.         Neurological Exam:  GEN: NAD, pleasant, cooperative  CVS: RRR  CHEST: No signs of resp distress, on room air    NEURO:  Higher Integrative Functions:  - Attentive, alert, non-verbal, does not follow verbal commands    Cranial nerves:  CN II: Visual fields are full to confrontation.   CN III, IV, VI: EOMI, no nystagmus, no ptosis  CN V: Facial sensation is intact in all 3 divisions bilaterally.  CN VII: Right lower facial weakness  CN VIII: Hearing is normal bilaterally  CN IX, X: Palate elevates symmetrically. Phonation is normal.  CN XI: Head turning and shoulder shrug are intact  CN XII: Tongue is midline with normal movements and no atrophy.    Motor: Muscle bulk and tone are normal.   -- RUE/RLE: Flaccid paralysis  -- LUE/LLE: Spontaneous movement, able to hold antigravity without drift    Sensory: Decreased sensation to noxious stimulus RUE/RLE    Coordination: There is no dystaxia on random movements LUE/LLE. There are no abnormal or extraneous " movements.    Gait/Stance: Deferred      Consult Data:     Consulting Provider:    Current Providers  No providers found      Emergency Department  Spoke hospital nurse at bedside with patient assisting consultant.     Patient information was obtained from primary team.     The attending portion of this evaluation, treatment, and documentation was performed per Selene Nugent MD via audiovisual.    Billing code:  (moderate to severe stroke, large areas of edema, some mimics)      This patient has a critical neurological condition/illness, with high morbidity and mortality.  There is a high probability for acute neurological change leading to clinical and possibly life-threatening deterioration requiring highest level of physician preparedness for urgent intervention.  Care was coordinated with other physicians involved in the patient's care.  Radiologic studies and laboratory data were reviewed and interpreted, and plan of care was re-assessed based on the results.  Diagnosis, treatment options and prognosis may have been discussed with the patient and/or family members or caregiver.  Further advanced medical management and further evaluation is warranted for his care.    Consult End Time: 1:57 PM     Selene Nugent MD, PhD  [facility]  Vascular Neurology   [facility]

## 2023-11-09 ENCOUNTER — LAB REQUISITION (OUTPATIENT)
Dept: LAB | Facility: HOSPITAL | Age: 68
End: 2023-11-09
Payer: MEDICARE

## 2023-11-09 DIAGNOSIS — I10 ESSENTIAL (PRIMARY) HYPERTENSION: ICD-10-CM

## 2023-11-09 DIAGNOSIS — E78.5 HYPERLIPIDEMIA, UNSPECIFIED: ICD-10-CM

## 2023-11-09 LAB
ALBUMIN SERPL-MCNC: 3.3 G/DL (ref 3.4–4.8)
ALBUMIN/GLOB SERPL: 1.1 RATIO (ref 1.1–2)
ALP SERPL-CCNC: 56 UNIT/L (ref 40–150)
ALT SERPL-CCNC: 16 UNIT/L (ref 0–55)
AST SERPL-CCNC: 22 UNIT/L (ref 5–34)
BASOPHILS # BLD AUTO: 0.03 X10(3)/MCL
BASOPHILS NFR BLD AUTO: 0.3 %
BILIRUB SERPL-MCNC: 0.7 MG/DL
BUN SERPL-MCNC: 15 MG/DL (ref 9.8–20.1)
CALCIUM SERPL-MCNC: 9.4 MG/DL (ref 8.4–10.2)
CHLORIDE SERPL-SCNC: 105 MMOL/L (ref 98–107)
CHOLEST SERPL-MCNC: 122 MG/DL
CHOLEST/HDLC SERPL: 4 {RATIO} (ref 0–5)
CO2 SERPL-SCNC: 26 MMOL/L (ref 23–31)
CREAT SERPL-MCNC: 0.85 MG/DL (ref 0.55–1.02)
EOSINOPHIL # BLD AUTO: 0.05 X10(3)/MCL (ref 0–0.9)
EOSINOPHIL NFR BLD AUTO: 0.5 %
ERYTHROCYTE [DISTWIDTH] IN BLOOD BY AUTOMATED COUNT: 13 % (ref 11.5–17)
GFR SERPLBLD CREATININE-BSD FMLA CKD-EPI: >60 MLS/MIN/1.73/M2
GLOBULIN SER-MCNC: 2.9 GM/DL (ref 2.4–3.5)
GLUCOSE SERPL-MCNC: 126 MG/DL (ref 82–115)
HCT VFR BLD AUTO: 36.5 % (ref 37–47)
HDLC SERPL-MCNC: 34 MG/DL (ref 35–60)
HGB BLD-MCNC: 12 G/DL (ref 12–16)
IMM GRANULOCYTES # BLD AUTO: 0.04 X10(3)/MCL (ref 0–0.04)
IMM GRANULOCYTES NFR BLD AUTO: 0.4 %
INR PPP: 1.1 (ref 2–3)
LDLC SERPL CALC-MCNC: 73 MG/DL (ref 50–140)
LYMPHOCYTES # BLD AUTO: 1.35 X10(3)/MCL (ref 0.6–4.6)
LYMPHOCYTES NFR BLD AUTO: 12.9 %
MCH RBC QN AUTO: 29.1 PG (ref 27–31)
MCHC RBC AUTO-ENTMCNC: 32.9 G/DL (ref 33–36)
MCV RBC AUTO: 88.4 FL (ref 80–94)
MONOCYTES # BLD AUTO: 0.53 X10(3)/MCL (ref 0.1–1.3)
MONOCYTES NFR BLD AUTO: 5.1 %
NEUTROPHILS # BLD AUTO: 8.44 X10(3)/MCL (ref 2.1–9.2)
NEUTROPHILS NFR BLD AUTO: 80.8 %
NRBC BLD AUTO-RTO: 0 %
PLATELET # BLD AUTO: 192 X10(3)/MCL (ref 130–400)
PMV BLD AUTO: 12.1 FL (ref 7.4–10.4)
POTASSIUM SERPL-SCNC: 4.4 MMOL/L (ref 3.5–5.1)
PREALB SERPL-MCNC: 19.1 MG/DL (ref 14–37)
PROT SERPL-MCNC: 6.2 GM/DL (ref 5.8–7.6)
PROTHROMBIN TIME: 14 SECONDS (ref 11.7–14.5)
RBC # BLD AUTO: 4.13 X10(6)/MCL (ref 4.2–5.4)
SODIUM SERPL-SCNC: 141 MMOL/L (ref 136–145)
TRIGL SERPL-MCNC: 77 MG/DL (ref 37–140)
VLDLC SERPL CALC-MCNC: 15 MG/DL
WBC # SPEC AUTO: 10.44 X10(3)/MCL (ref 4.5–11.5)

## 2023-11-09 PROCEDURE — 80053 COMPREHEN METABOLIC PANEL: CPT | Performed by: NURSE PRACTITIONER

## 2023-11-09 PROCEDURE — 85610 PROTHROMBIN TIME: CPT | Performed by: NURSE PRACTITIONER

## 2023-11-09 PROCEDURE — 84134 ASSAY OF PREALBUMIN: CPT | Performed by: NURSE PRACTITIONER

## 2023-11-09 PROCEDURE — 80061 LIPID PANEL: CPT | Performed by: NURSE PRACTITIONER

## 2023-11-09 PROCEDURE — 85025 COMPLETE CBC W/AUTO DIFF WBC: CPT | Performed by: NURSE PRACTITIONER

## 2023-11-24 ENCOUNTER — TELEMEDICINE (OUTPATIENT)
Dept: NEUROLOGY | Facility: CLINIC | Age: 68
End: 2023-11-24
Payer: MEDICARE

## 2023-11-24 DIAGNOSIS — E87.0 HYPERNATREMIA: ICD-10-CM

## 2023-11-24 DIAGNOSIS — R41.82 ALTERED MENTAL STATUS, UNSPECIFIED ALTERED MENTAL STATUS TYPE: Primary | ICD-10-CM

## 2023-11-24 DIAGNOSIS — Z86.73 H/O ISCHEMIC LEFT MCA STROKE: ICD-10-CM

## 2023-11-24 DIAGNOSIS — I26.99 RIGHT PULMONARY EMBOLUS: ICD-10-CM

## 2023-11-24 PROCEDURE — G0427 INPT/ED TELECONSULT70: HCPCS | Mod: 95,,, | Performed by: PSYCHIATRY & NEUROLOGY

## 2023-11-24 PROCEDURE — G0427 PR INPT TELEHEALTH CON 70/>M: ICD-10-PCS | Mod: 95,,, | Performed by: PSYCHIATRY & NEUROLOGY

## 2023-11-24 NOTE — TELEMEDICINE CONSULT
"[facility] from Neurology    Consultation started: 11/24/2023 at 11:51 AM   The chief complaint leading to consultation is: stroke-like symptoms  This consultation was requested by:    The patient location is: [facility]  The patient arrived at: 1140am  Spoke nurse at bedside with patient assisting consultant. Also present with the patient at the time of the consultation:None    Consults     Subjective:     History of Present Illness:  Griffin Swartz is a 68 y.o. female who presents with stroke-like symptoms.    Admitted 11/3/23 for subacute L MCA infarct. Notd to have residual RSW, aphasia, and neglected.  Admitted now from NH as she was found "slumped over",  Here, MRI found to have no new infarcts, only evolving known L MCA stroke.  Found to have elevated Na (154), JOSE RAUL, and right upper lobe PE. Would like to start eliquis for this.  Per nurse, she saw her last admission and no appreciable significant changes from recent post stroke baseline aside from dysphagia.  No other history aside from being slumped over per attending.    Patient information was obtained from past medical records, ER records, and primary team.    No past medical history on file.    No past surgical history on file.    No family history on file.     Social History     Tobacco Use    Smoking status: Not on file    Smokeless tobacco: Not on file   Substance Use Topics    Alcohol use: Not on file        Medications: No current outpatient medications on file.    Allergies: Review of patient's allergies indicates:  No Known Allergies    Review Of Systems: ROS      Objective:     Vitals: There were no vitals taken for this visit. Reviewed for date of service     MENTAL STATUS: alert to voice, right neglect. Does not follow any commands, aphasic  CRANIAL NERVE EXAM: intermittently tracks nurse to left, doesn't cross midline.  R NLFF.    MOTOR EXAM:  has spontaneous 3/5 movement to left UE & LE. 0/5 to right body, even with " painful stim   COORDINATION: Unable to test  GAIT: Unable to test      Labs:Reviewed for date of service  Radiology (i.e. PET Scan, X-ray, CT, MRI): Reviewed for date of service        MRI Brain: no new infarct/bleed, evolution of prior L MCA infarct      Assessment - Diagnosis - Goals:     Impression: Griffin Swartz 68 y.o. female who presented from NH, found to be hypernatremic with right PE. Recent subacute L MCA stroke earlier this month with known PFO. No other new documented changes aside from being slumped over at NH. MRI neg for new stroke. Unclear if seizure activity in interim, but none noted. Confirmed with primary team    Recommendations:   - routine EEG  - no new imaging  - clear to start eliquis for PE as more than two weeks since prior stroke. Can d/c asa + plavix during this time as also suspect embolic source of stroke with known PFO, pending cardiac 30d monitor.  - repeat HCT if new or worsening changes from baseline  - correct electrolyte abnormalites, defer to primary team  - check for UTI  - replete Mg  - re-engage after EEG    Consultation ended:  2:30 PM     Total time spent with patient: > 70 Minutes      The attending portion of this evaluation, treatment, and documentation was performed per Nieves Brandt MD via audiovisual.        Thank you for this consult. Please do not hesitate to contact us with questions.

## 2023-12-01 ENCOUNTER — LAB REQUISITION (OUTPATIENT)
Dept: LAB | Facility: HOSPITAL | Age: 68
End: 2023-12-01
Payer: MEDICARE

## 2023-12-01 DIAGNOSIS — I63.81 OTHER CEREBRAL INFARCTION DUE TO OCCLUSION OR STENOSIS OF SMALL ARTERY: ICD-10-CM

## 2023-12-01 LAB
ALBUMIN SERPL-MCNC: 2.5 G/DL (ref 3.4–4.8)
ALBUMIN/GLOB SERPL: 0.7 RATIO (ref 1.1–2)
ALP SERPL-CCNC: 50 UNIT/L (ref 40–150)
ALT SERPL-CCNC: 32 UNIT/L (ref 0–55)
AST SERPL-CCNC: 30 UNIT/L (ref 5–34)
BASOPHILS # BLD AUTO: 0.03 X10(3)/MCL
BASOPHILS NFR BLD AUTO: 0.5 %
BILIRUB SERPL-MCNC: 0.3 MG/DL
BUN SERPL-MCNC: 7.1 MG/DL (ref 9.8–20.1)
CALCIUM SERPL-MCNC: 9 MG/DL (ref 8.4–10.2)
CHLORIDE SERPL-SCNC: 109 MMOL/L (ref 98–107)
CHOLEST SERPL-MCNC: 89 MG/DL
CHOLEST/HDLC SERPL: 4 {RATIO} (ref 0–5)
CO2 SERPL-SCNC: 25 MMOL/L (ref 23–31)
CREAT SERPL-MCNC: 0.69 MG/DL (ref 0.55–1.02)
EOSINOPHIL # BLD AUTO: 0.07 X10(3)/MCL (ref 0–0.9)
EOSINOPHIL NFR BLD AUTO: 1.1 %
ERYTHROCYTE [DISTWIDTH] IN BLOOD BY AUTOMATED COUNT: 13 % (ref 11.5–17)
GFR SERPLBLD CREATININE-BSD FMLA CKD-EPI: >60 MLS/MIN/1.73/M2
GLOBULIN SER-MCNC: 3.4 GM/DL (ref 2.4–3.5)
GLUCOSE SERPL-MCNC: 146 MG/DL (ref 82–115)
HCT VFR BLD AUTO: 34.6 % (ref 37–47)
HDLC SERPL-MCNC: 23 MG/DL (ref 35–60)
HGB BLD-MCNC: 11.2 G/DL (ref 12–16)
IMM GRANULOCYTES # BLD AUTO: 0.01 X10(3)/MCL (ref 0–0.04)
IMM GRANULOCYTES NFR BLD AUTO: 0.2 %
INR PPP: 1 (ref 2–3)
LDLC SERPL CALC-MCNC: 51 MG/DL (ref 50–140)
LYMPHOCYTES # BLD AUTO: 1.71 X10(3)/MCL (ref 0.6–4.6)
LYMPHOCYTES NFR BLD AUTO: 25.9 %
MCH RBC QN AUTO: 29.4 PG (ref 27–31)
MCHC RBC AUTO-ENTMCNC: 32.4 G/DL (ref 33–36)
MCV RBC AUTO: 90.8 FL (ref 80–94)
MONOCYTES # BLD AUTO: 0.49 X10(3)/MCL (ref 0.1–1.3)
MONOCYTES NFR BLD AUTO: 7.4 %
NEUTROPHILS # BLD AUTO: 4.28 X10(3)/MCL (ref 2.1–9.2)
NEUTROPHILS NFR BLD AUTO: 64.9 %
NRBC BLD AUTO-RTO: 0 %
PLATELET # BLD AUTO: 188 X10(3)/MCL (ref 130–400)
PMV BLD AUTO: 12.6 FL (ref 7.4–10.4)
POTASSIUM SERPL-SCNC: 4.3 MMOL/L (ref 3.5–5.1)
PREALB SERPL-MCNC: 12.7 MG/DL (ref 14–37)
PROT SERPL-MCNC: 5.9 GM/DL (ref 5.8–7.6)
PROTHROMBIN TIME: 13.5 SECONDS (ref 11.7–14.5)
RBC # BLD AUTO: 3.81 X10(6)/MCL (ref 4.2–5.4)
SODIUM SERPL-SCNC: 140 MMOL/L (ref 136–145)
TRIGL SERPL-MCNC: 76 MG/DL (ref 37–140)
VLDLC SERPL CALC-MCNC: 15 MG/DL
WBC # SPEC AUTO: 6.59 X10(3)/MCL (ref 4.5–11.5)

## 2023-12-01 PROCEDURE — 80053 COMPREHEN METABOLIC PANEL: CPT | Performed by: NURSE PRACTITIONER

## 2023-12-01 PROCEDURE — 85025 COMPLETE CBC W/AUTO DIFF WBC: CPT | Performed by: NURSE PRACTITIONER

## 2023-12-01 PROCEDURE — 80061 LIPID PANEL: CPT | Performed by: NURSE PRACTITIONER

## 2023-12-01 PROCEDURE — 85610 PROTHROMBIN TIME: CPT | Performed by: NURSE PRACTITIONER

## 2023-12-01 PROCEDURE — 84134 ASSAY OF PREALBUMIN: CPT | Performed by: NURSE PRACTITIONER

## 2023-12-06 ENCOUNTER — LAB REQUISITION (OUTPATIENT)
Dept: LAB | Facility: HOSPITAL | Age: 68
End: 2023-12-06
Payer: MEDICARE

## 2023-12-06 DIAGNOSIS — L89.150 PRESSURE ULCER OF SACRAL REGION, UNSTAGEABLE: ICD-10-CM

## 2023-12-06 PROCEDURE — 87070 CULTURE OTHR SPECIMN AEROBIC: CPT | Performed by: INTERNAL MEDICINE

## 2023-12-09 LAB
BACTERIA WND CULT: ABNORMAL
BACTERIA WND CULT: ABNORMAL

## 2024-02-05 PROBLEM — I63.232 STROKE DUE TO STENOSIS OF LEFT CAROTID ARTERY: Status: RESOLVED | Noted: 2023-11-03 | Resolved: 2024-02-05
